# Patient Record
Sex: FEMALE | Race: WHITE | Employment: FULL TIME | URBAN - METROPOLITAN AREA
[De-identification: names, ages, dates, MRNs, and addresses within clinical notes are randomized per-mention and may not be internally consistent; named-entity substitution may affect disease eponyms.]

---

## 2018-08-28 ENCOUNTER — OFFICE VISIT (OUTPATIENT)
Dept: FAMILY MEDICINE CLINIC | Facility: CLINIC | Age: 43
End: 2018-08-28

## 2018-08-28 VITALS
HEIGHT: 65 IN | DIASTOLIC BLOOD PRESSURE: 72 MMHG | HEART RATE: 82 BPM | SYSTOLIC BLOOD PRESSURE: 110 MMHG | BODY MASS INDEX: 31.65 KG/M2 | RESPIRATION RATE: 14 BRPM | TEMPERATURE: 99 F | WEIGHT: 190 LBS

## 2018-08-28 DIAGNOSIS — J03.90 EXUDATIVE TONSILLITIS: Primary | ICD-10-CM

## 2018-08-28 LAB
CONTROL LINE PRESENT WITH A CLEAR BACKGROUND (YES/NO): PRESENT YES/NO
STREP GRP A CUL-SCR: NEGATIVE

## 2018-08-28 PROCEDURE — 87880 STREP A ASSAY W/OPTIC: CPT | Performed by: NURSE PRACTITIONER

## 2018-08-28 PROCEDURE — 87081 CULTURE SCREEN ONLY: CPT | Performed by: NURSE PRACTITIONER

## 2018-08-28 PROCEDURE — 99202 OFFICE O/P NEW SF 15 MIN: CPT | Performed by: NURSE PRACTITIONER

## 2018-08-28 RX ORDER — CEFDINIR 300 MG/1
300 CAPSULE ORAL 2 TIMES DAILY
Qty: 20 CAPSULE | Refills: 0 | Status: SHIPPED | OUTPATIENT
Start: 2018-08-28 | End: 2018-09-07

## 2018-08-28 RX ORDER — THYROID,PORK 48.75 MG
TABLET ORAL
Refills: 3 | COMMUNITY
Start: 2018-08-06

## 2018-08-28 NOTE — PATIENT INSTRUCTIONS
When Your Child Has Pharyngitis or Tonsillitis    Your child’s throat feels sore. This is likely because of redness and swelling (inflammation) of the throat. Two areas of the throat are most often affected: the pharynx and tonsils.  Inflammation of the p If your child has frequent sore throats, take him or her to see a healthcare provider. Removing the tonsils may help relieve your child’s recurring problems.   When to call your child's healthcare provider  Call your child’s healthcare provider right away i · Repeated temperature of 104°F (40°C) or higher, or as directed by the provider  · Fever that lasts more than 24 hours in a child under 3years old. Or a fever that lasts for 3 days in a child 2 years or older.    Date Last Reviewed: 11/1/2016  © 6019-4468 · Use the antibiotic medicine for the full 10 days. Do not stop the medicine even if you or your child feel better. This is very important to make sure the infection is fully treated. It is also important to prevent medicine-resistant germs from growing.  I ? Your child is younger than 3years of age and has a fever of 100.4°F (38°C) for more than 1 day. ? Your child is 3years old or older and has a fever of 100.4°F (38°C) for more than 3 days.   · New or worsening ear pain, sinus pain, or headache  · Painfu

## 2018-08-28 NOTE — PROGRESS NOTES
CHIEF COMPLAINT:   Patient presents with:  Sore Throat      HPI:   Macho Howell is a 40year old female who presents with sore throat for 4 days. Onset was quick ,   Symptoms are progressing. Location is reported as mid throat.   Description is reported /72   Pulse 82   Temp 98.6 °F (37 °C)   Resp 14   Ht 65\"   Wt 190 lb   LMP 02/01/2015 (Approximate)   Breastfeeding?  No   BMI 31.62 kg/m²   GENERAL: well developed, well nourished, in no apparent distress  SKIN: no rashes, no suspicious lesions  HEA Discussed possibility of mononucleosis infection, but at this time symptoms are not reflective of mono infection.  If s/sx persist will consider MonoSpot or further lab work.      Meds & Refills for this Visit:    Signed Prescriptions Disp Refills    cefdin · Give your child acetaminophen or ibuprofen to ease the pain. Don't use ibuprofen in children younger than 10months of age or in children who are dehydrated or vomiting all of the time. Don’t give your child aspirin to relieve a fever.  Using aspirin to tr Infant under 3 months old:  · Ask your child’s healthcare provider how you should take the temperature.   · Rectal or forehead (temporal artery) temperature of 100.4°F (38°C) or higher, or as directed by the provider  · Armpit temperature of 99°F (37.2°C) o A test has been done to find out if you or your child have strep throat. Call this facility or your healthcare provider if you were not given your test results. If the test is positive for strep infection, you will need to take antibiotic medicines.  A pres Other medicine for a child: You can give your child acetaminophen for fever, fussiness, or discomfort. In babies over 7 months of age, you may use ibuprofen instead of acetaminophen.  If your child has chronic liver or kidney disease or ever had a stomach u · Don’t have close contact with people who have sore throats, colds, or other upper respiratory infections. · Don’t smoke, and stay away from secondhand smoke. · Stay up to date with of your vaccines.   Date Last Reviewed: 11/1/2017  © 0732-9055 [employer]

## 2019-11-19 ENCOUNTER — LAB ENCOUNTER (OUTPATIENT)
Dept: LAB | Age: 44
End: 2019-11-19
Attending: FAMILY MEDICINE
Payer: COMMERCIAL

## 2019-11-19 ENCOUNTER — OFFICE VISIT (OUTPATIENT)
Dept: FAMILY MEDICINE CLINIC | Facility: CLINIC | Age: 44
End: 2019-11-19
Payer: COMMERCIAL

## 2019-11-19 VITALS
WEIGHT: 170 LBS | SYSTOLIC BLOOD PRESSURE: 112 MMHG | HEART RATE: 80 BPM | DIASTOLIC BLOOD PRESSURE: 72 MMHG | BODY MASS INDEX: 28.32 KG/M2 | RESPIRATION RATE: 20 BRPM | TEMPERATURE: 97 F | HEIGHT: 65 IN

## 2019-11-19 DIAGNOSIS — Z00.00 ROUTINE MEDICAL EXAM: Primary | ICD-10-CM

## 2019-11-19 DIAGNOSIS — Z01.419 ROUTINE GYNECOLOGICAL EXAMINATION: ICD-10-CM

## 2019-11-19 DIAGNOSIS — Z00.00 ROUTINE MEDICAL EXAM: ICD-10-CM

## 2019-11-19 DIAGNOSIS — N91.2 AMENORRHEA: ICD-10-CM

## 2019-11-19 DIAGNOSIS — Z12.31 VISIT FOR SCREENING MAMMOGRAM: ICD-10-CM

## 2019-11-19 PROCEDURE — 36415 COLL VENOUS BLD VENIPUNCTURE: CPT

## 2019-11-19 PROCEDURE — 83001 ASSAY OF GONADOTROPIN (FSH): CPT

## 2019-11-19 PROCEDURE — 99386 PREV VISIT NEW AGE 40-64: CPT | Performed by: FAMILY MEDICINE

## 2019-11-19 PROCEDURE — 84443 ASSAY THYROID STIM HORMONE: CPT

## 2019-11-19 PROCEDURE — 84146 ASSAY OF PROLACTIN: CPT

## 2019-11-19 PROCEDURE — 80053 COMPREHEN METABOLIC PANEL: CPT

## 2019-11-19 PROCEDURE — 85025 COMPLETE CBC W/AUTO DIFF WBC: CPT

## 2019-11-19 PROCEDURE — 83002 ASSAY OF GONADOTROPIN (LH): CPT

## 2019-11-19 PROCEDURE — 81001 URINALYSIS AUTO W/SCOPE: CPT

## 2019-11-19 PROCEDURE — 84439 ASSAY OF FREE THYROXINE: CPT

## 2019-11-19 PROCEDURE — 80061 LIPID PANEL: CPT

## 2019-11-19 NOTE — PROGRESS NOTES
HPI:   Hammad Echeverria is a 38year old female who presents for a complete physical exam.   No LMP recorded. (Menstrual status: Other). New to me  Pt states she is here for physical and pap.  Pt states she has not had anything to eat or drink since last ni BMI 28.29 kg/m²   Body mass index is 28.29 kg/m².    GENERAL: well developed, well nourished,in no apparent distress  SKIN: no rashes,no suspicious lesions  HEENT: atraumatic, normocephalic,ears and throat are clear  EYES:PERRLA, EOMI,,conjunctiva are clear 11/18/2020      Lipid Panel [E]          Standing Status: Future          Standing Expiration Date: 11/18/2020      TSH [E]          Standing Status: Future          Standing Expiration Date: 11/18/2020      Thyroxine, Free [E]          Standing Status:  Fu

## 2019-11-20 NOTE — PROGRESS NOTES
Maryan - Your labs do confirm you are in menopause. The [facility] is high which is consistent with menopause. The other hormone was normal -prolactin. I do not see a reason to MRI your brain at this time.  Your cholesterol and other labs were all normal. - Dr. Giselle Dawson

## 2019-11-21 NOTE — PROGRESS NOTES
Maryan - Normal pap.  Repeat in 5 years but you should still be having yearly physicals. -Dr. Boris Doherty

## 2019-11-22 ENCOUNTER — NURSE TRIAGE (OUTPATIENT)
Dept: FAMILY MEDICINE CLINIC | Facility: CLINIC | Age: 44
End: 2019-11-22

## 2019-11-22 DIAGNOSIS — N30.00 ACUTE CYSTITIS WITHOUT HEMATURIA: Primary | ICD-10-CM

## 2019-11-22 RX ORDER — NITROFURANTOIN 25; 75 MG/1; MG/1
100 CAPSULE ORAL 2 TIMES DAILY
Qty: 14 CAPSULE | Refills: 0 | Status: SHIPPED | OUTPATIENT
Start: 2019-11-22 | End: 2019-11-22

## 2019-11-22 RX ORDER — NITROFURANTOIN 25; 75 MG/1; MG/1
100 CAPSULE ORAL 2 TIMES DAILY
Qty: 14 CAPSULE | Refills: 0 | Status: SHIPPED | OUTPATIENT
Start: 2019-11-22

## 2019-11-22 NOTE — TELEPHONE ENCOUNTER
Patient was just seen on Tuesday and had a UA completed. States she was symptomatic at that visit. Hoping not to have to come in again.

## 2019-11-22 NOTE — TELEPHONE ENCOUNTER
----- Message from Cleopatra Toney sent at 11/21/2019 12:01 PM CST -----  Regarding: Test Results Question  Contact: [phone]  Traces of bacteria, squamous and leukocyte esterase. Are these indicators of a bladder infection?  I have frequent urination with

## 2019-11-22 NOTE — TELEPHONE ENCOUNTER
Please set up an appt for patient to be seen tomorrow morning with me to dip her urine. If my schedule does not work with her availability, she can go to [facility]. Thanks.

## 2019-11-22 NOTE — TELEPHONE ENCOUNTER
Elaine:  Please review below encounter. Patient hoping for a response today. She is leaving for vacation to [place] on Sunday. Thank you.

## 2024-04-30 PROBLEM — H01.9 INFLAMMATION OF EYELID: Status: ACTIVE | Noted: 2017-02-14

## 2024-04-30 PROBLEM — H10.502 UNSPECIFIED BLEPHAROCONJUNCTIVITIS, LEFT EYE: Status: ACTIVE | Noted: 2017-02-14
